# Patient Record
Sex: FEMALE | Race: WHITE | Employment: OTHER | URBAN - NONMETROPOLITAN AREA
[De-identification: names, ages, dates, MRNs, and addresses within clinical notes are randomized per-mention and may not be internally consistent; named-entity substitution may affect disease eponyms.]

---

## 2023-12-04 ENCOUNTER — OFFICE VISIT (OUTPATIENT)
Dept: RADIATION ONCOLOGY | Age: 78
End: 2023-12-04
Payer: MEDICARE

## 2023-12-04 VITALS
SYSTOLIC BLOOD PRESSURE: 127 MMHG | DIASTOLIC BLOOD PRESSURE: 70 MMHG | TEMPERATURE: 97.2 F | WEIGHT: 123.9 LBS | HEART RATE: 70 BPM | OXYGEN SATURATION: 98 % | RESPIRATION RATE: 20 BRPM

## 2023-12-04 DIAGNOSIS — C02.9 TONGUE CANCER (HCC): Primary | ICD-10-CM

## 2023-12-04 PROCEDURE — 1090F PRES/ABSN URINE INCON ASSESS: CPT | Performed by: NURSE PRACTITIONER

## 2023-12-04 PROCEDURE — 99213 OFFICE O/P EST LOW 20 MIN: CPT | Performed by: NURSE PRACTITIONER

## 2023-12-04 PROCEDURE — 1123F ACP DISCUSS/DSCN MKR DOCD: CPT | Performed by: NURSE PRACTITIONER

## 2023-12-04 PROCEDURE — G8484 FLU IMMUNIZE NO ADMIN: HCPCS | Performed by: NURSE PRACTITIONER

## 2023-12-04 PROCEDURE — G8400 PT W/DXA NO RESULTS DOC: HCPCS | Performed by: NURSE PRACTITIONER

## 2023-12-04 PROCEDURE — G8421 BMI NOT CALCULATED: HCPCS | Performed by: NURSE PRACTITIONER

## 2023-12-04 PROCEDURE — 1036F TOBACCO NON-USER: CPT | Performed by: NURSE PRACTITIONER

## 2023-12-04 PROCEDURE — G8427 DOCREV CUR MEDS BY ELIG CLIN: HCPCS | Performed by: NURSE PRACTITIONER

## 2023-12-04 RX ORDER — FOLIC ACID 1 MG/1
1 TABLET ORAL DAILY
COMMUNITY
Start: 2021-02-03

## 2023-12-04 ASSESSMENT — ENCOUNTER SYMPTOMS
SORE THROAT: 0
SHORTNESS OF BREATH: 0
ABDOMINAL PAIN: 0
BACK PAIN: 0
VOICE CHANGE: 0
BLOOD IN STOOL: 0
NAUSEA: 0
SINUS PRESSURE: 0
TROUBLE SWALLOWING: 0
RECTAL PAIN: 0
COUGH: 0
SINUS PAIN: 0
FACIAL SWELLING: 0

## 2023-12-04 NOTE — PROGRESS NOTES
[facility]     [address] [place], [address] [facility]  Phone: [phone] - Option 2  Fax:[phone]               FOLLOW UP    Date of Service: 2023  Patient ID: Bryan Mora   :   MRN: [id number]   Acct Number:   CSN NO: [de-identified]      LOCATION: Radiation Oncology  PROVIDER: Glenis JENKINS    DATE OF SERVICE: 2023      FOLLOW UP PHYSICIANS: Brenda Power       DIAGNOSIS: H80.211 -- Personal history of malignant neoplasm of tongue: C02.3 -- Malignant neoplasm of   anterior two-thirds of tongue, Squamous Cell Carcinoma, T2 N2b M0, Stage FRANCK   C10.9 -- New malignant neoplasm of oropharynx (glossotonsillar region); Squamous cell carcinoma, T1 N0 M0 stage I, resected [p16 status?]. 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 No unexpected complications related to radiation therapy (expected long-term side effects, adequately managed). PLAN:  Follow-up with radiation oncology in 12 months. Reminded Javier Kennedy that she can contact this office prior to appointment with any questions or concerns. Continue care in otolaryngology and with other physicians/providers as scheduled. Continue surveillance and basic/preventive/supportive health care in accordance with clinical practice guidelines. RADIATION THERAPY TREATMENT HISTORY:   Radiation Oncology - Course: 1    Treatment Site Current Dose Modality From To Elapsed Days Fx.    L tongue+neck nodes  5,000 cGy 6MV  2013 35 25   Bst gross